# Patient Record
Sex: FEMALE | Employment: FULL TIME | URBAN - METROPOLITAN AREA
[De-identification: names, ages, dates, MRNs, and addresses within clinical notes are randomized per-mention and may not be internally consistent; named-entity substitution may affect disease eponyms.]

---

## 2022-12-26 ENCOUNTER — HOSPITAL ENCOUNTER (OUTPATIENT)
Dept: RADIOLOGY | Facility: MEDICAL CENTER | Age: 27
End: 2022-12-26
Attending: NURSE PRACTITIONER
Payer: COMMERCIAL

## 2022-12-26 DIAGNOSIS — J45.909 ASTHMATIC BRONCHITIS WITHOUT COMPLICATION, UNSPECIFIED ASTHMA SEVERITY, UNSPECIFIED WHETHER PERSISTENT: ICD-10-CM

## 2022-12-26 PROCEDURE — 71046 X-RAY EXAM CHEST 2 VIEWS: CPT

## 2022-12-27 ASSESSMENT — ENCOUNTER SYMPTOMS
CHEST TIGHTNESS: 1
SHORTNESS OF BREATH: 1
DYSPNEA AT REST: 1
HEMOPTYSIS: 0
WHEEZING: 1

## 2022-12-28 ENCOUNTER — OFFICE VISIT (OUTPATIENT)
Dept: SLEEP MEDICINE | Facility: MEDICAL CENTER | Age: 27
End: 2022-12-28
Payer: COMMERCIAL

## 2022-12-28 VITALS
HEIGHT: 63 IN | HEART RATE: 73 BPM | BODY MASS INDEX: 30.48 KG/M2 | WEIGHT: 172 LBS | DIASTOLIC BLOOD PRESSURE: 72 MMHG | SYSTOLIC BLOOD PRESSURE: 122 MMHG | OXYGEN SATURATION: 96 %

## 2022-12-28 DIAGNOSIS — R06.00 DYSPNEA, UNSPECIFIED TYPE: ICD-10-CM

## 2022-12-28 PROCEDURE — 99205 OFFICE O/P NEW HI 60 MIN: CPT | Performed by: STUDENT IN AN ORGANIZED HEALTH CARE EDUCATION/TRAINING PROGRAM

## 2022-12-28 RX ORDER — FLUTICASONE FUROATE AND VILANTEROL 100; 25 UG/1; UG/1
1 POWDER RESPIRATORY (INHALATION) EVERY 8 HOURS PRN
Qty: 60 EACH | Refills: 1 | Status: SHIPPED | OUTPATIENT
Start: 2022-12-28

## 2022-12-28 ASSESSMENT — ENCOUNTER SYMPTOMS
EYE DISCHARGE: 0
COUGH: 0
FOCAL WEAKNESS: 0
FEVER: 0
HEMOPTYSIS: 0
FALLS: 0
SPUTUM PRODUCTION: 0
NAUSEA: 0
VOMITING: 0
CHILLS: 0
WHEEZING: 1
SHORTNESS OF BREATH: 1

## 2022-12-28 NOTE — PROGRESS NOTES
Pulmonary Clinic Note    Chief Complaint:  Chief Complaint   Patient presents with    Establish Care     Referred by MEGHNA Porras for Reactive airway disease    Results     DX-Chest 12/26/22     HPI:   Jil Reyna is a very pleasant 27 y.o. female who presents to pulmonary clinic for chest tightness.  Patient states about 4 to 6 months ago, she started feeling some pressure-like feeling/tightness in her chest only at night when she would lay down and sometimes worse when she was laying on her right side.  And then recently this started happening randomly during the daytime and sometimes when she is exercising, she feels short of breath and feels like she has some wheezing.  Feels like the dry air in [place] may be making her symptoms worse-she moved here in 2020. She got an albuterol inhaler that has helped the symptoms but increases her heart rate/palpitations.  She denies any environmental triggers or the cold.  States that she keep this her home warm at night.  She does report intermittent reflux but nothing significant.  No significant exposures.  Patient states that she has gained about 40 pounds in the last 2 years and feels like this might be worsening her symptoms as well.  She reports that back in high school she had some wheezing after she exercised.  Her symptoms resolved about 2 weeks ago and then restarted again yesterday.  No fevers, chills, cough, sinus congestion, postnasal drip, nausea vomiting, weight loss, malaise/fatigue, myalgias, joint pains/aches, rashes.  No prior history of asthma or recurrent infections.        Past Medical History:   Diagnosis Date    Shortness of breath     Wheezing        History reviewed. No pertinent surgical history.    Social History     Socioeconomic History    Marital status: Unknown     Spouse name: Not on file    Number of children: Not on file    Years of education: Not on file    Highest education level: Not on file   Occupational History    Not on file  "  Tobacco Use    Smoking status: Never    Smokeless tobacco: Never   Vaping Use    Vaping Use: Never used   Substance and Sexual Activity    Alcohol use: Never    Drug use: Never    Sexual activity: Not on file   Other Topics Concern    Not on file   Social History Narrative    Not on file     Social Determinants of Health     Financial Resource Strain: Not on file   Food Insecurity: Not on file   Transportation Needs: Not on file   Physical Activity: Not on file   Stress: Not on file   Social Connections: Not on file   Intimate Partner Violence: Not on file   Housing Stability: Not on file          There is no pertinent family history.     Current Outpatient Medications on File Prior to Visit   Medication Sig Dispense Refill    IPRATROPIUM-ALBUTEROL INH Take 3 mL by nebulization 4 times a day.       No current facility-administered medications on file prior to visit.       Allergies: Patient has no allergy information on record.      ROS:   Review of Systems   Constitutional:  Negative for chills and fever.   HENT:  Negative for hearing loss.    Eyes:  Negative for discharge.   Respiratory:  Positive for shortness of breath and wheezing. Negative for cough, hemoptysis and sputum production.    Gastrointestinal:  Negative for nausea and vomiting.   Musculoskeletal:  Negative for falls.   Skin:  Negative for rash.   Neurological:  Negative for focal weakness.     Vitals:  /72 (BP Location: Right arm, Patient Position: Sitting, BP Cuff Size: Adult)   Pulse 73   Ht 1.6 m (5' 3\")   Wt 78 kg (172 lb)   SpO2 96%     Physical Exam:  Physical Exam  Vitals and nursing note reviewed.   Constitutional:       General: She is not in acute distress.     Appearance: Normal appearance. She is not ill-appearing or toxic-appearing.   HENT:      Head: Normocephalic and atraumatic.      Nose: Nose normal.      Mouth/Throat:      Mouth: Mucous membranes are moist.   Eyes:      General: No scleral icterus.     " Conjunctiva/sclera: Conjunctivae normal.   Cardiovascular:      Rate and Rhythm: Normal rate and regular rhythm.   Pulmonary:      Effort: Pulmonary effort is normal. No respiratory distress.      Breath sounds: No wheezing, rhonchi or rales.   Abdominal:      Palpations: Abdomen is soft.   Musculoskeletal:         General: No deformity or signs of injury. Normal range of motion.      Cervical back: Normal range of motion.   Skin:     General: Skin is warm and dry.   Neurological:      General: No focal deficit present.      Mental Status: She is alert. Mental status is at baseline.   Psychiatric:         Mood and Affect: Mood normal.         Behavior: Behavior normal.       Data:    CXR 12.26.22  1.  Questionable hazy opacity medial right lower lobe or right middle lobe seen only on the PA view which could represent atelectasis or could be due to overlapping vascular structures.    Assessment/Plan:    Problem List Items Addressed This Visit       Dyspnea     Symptoms potentially secondary to asthma/reactive airways.     -Start as needed ICS/LABA -discussed her uses before exercise as well as as needed to see if improves her symptoms  -Encouraged to stay active/exercise routinely to aid with her weight loss  -PFTs  -CXR shows there is hazy opacity in the medial RLL or RML unclear etiology-I did discuss with patient that she could have some sort of potential mass or obstruction here causing some of her symptoms or this could just be overlapping vascular structures are seen on the CXR.  We discussed that we can do a CT chest to give further information.  Patient would like to try the inhaler and breathing exercise/diet regimen for some weight loss and if she does not note any significant improvement in her symptoms, then she agrees to get a CT at next follow-up         Relevant Orders    PULMONARY FUNCTION TESTS -Test requested: Complete Pulmonary Function Test       Return in about 3 months (around 3/28/2023).      This note was generated using voice recognition software which has a chance of producing errors of grammar and possibly content.  I have made every reasonable attempt to find and correct any obvious errors, but it should be expected that some may not be found prior to finalization of this note.    Time spent in record review prior to patient arrival, reviewing results, and in face-to-face encounter totaled 60 min, excluding any procedures if performed.    Cinda Bustillo MD  Pulmonary and Critical Care Medicine  [facility]

## 2022-12-28 NOTE — ASSESSMENT & PLAN NOTE
Symptoms potentially secondary to asthma/reactive airways.     -Start as needed ICS/LABA -discussed her uses before exercise as well as as needed to see if improves her symptoms  -Encouraged to stay active/exercise routinely to aid with her weight loss  -PFTs  -CXR shows there is hazy opacity in the medial RLL or RML unclear etiology-I did discuss with patient that she could have some sort of potential mass or obstruction here causing some of her symptoms or this could just be overlapping vascular structures are seen on the CXR.  We discussed that we can do a CT chest to give further information.  Patient would like to try the inhaler and breathing exercise/diet regimen for some weight loss and if she does not note any significant improvement in her symptoms, then she agrees to get a CT at next follow-up

## 2023-01-20 ENCOUNTER — TELEPHONE (OUTPATIENT)
Dept: HEALTH INFORMATION MANAGEMENT | Facility: OTHER | Age: 28
End: 2023-01-20
Payer: COMMERCIAL

## 2024-03-18 ENCOUNTER — APPOINTMENT (RX ONLY)
Dept: URBAN - METROPOLITAN AREA CLINIC 4 | Facility: CLINIC | Age: 29
Setting detail: DERMATOLOGY
End: 2024-03-18

## 2024-03-18 DIAGNOSIS — L81.4 OTHER MELANIN HYPERPIGMENTATION: ICD-10-CM

## 2024-03-18 DIAGNOSIS — Z71.89 OTHER SPECIFIED COUNSELING: ICD-10-CM

## 2024-03-18 DIAGNOSIS — L85.3 XEROSIS CUTIS: ICD-10-CM

## 2024-03-18 DIAGNOSIS — D18.0 HEMANGIOMA: ICD-10-CM

## 2024-03-18 DIAGNOSIS — L82.1 OTHER SEBORRHEIC KERATOSIS: ICD-10-CM

## 2024-03-18 DIAGNOSIS — D22 MELANOCYTIC NEVI: ICD-10-CM

## 2024-03-18 PROBLEM — D48.5 NEOPLASM OF UNCERTAIN BEHAVIOR OF SKIN: Status: ACTIVE | Noted: 2024-03-18

## 2024-03-18 PROBLEM — D22.5 MELANOCYTIC NEVI OF TRUNK: Status: ACTIVE | Noted: 2024-03-18

## 2024-03-18 PROBLEM — D18.01 HEMANGIOMA OF SKIN AND SUBCUTANEOUS TISSUE: Status: ACTIVE | Noted: 2024-03-18

## 2024-03-18 PROCEDURE — ? COUNSELING

## 2024-03-18 PROCEDURE — 99203 OFFICE O/P NEW LOW 30 MIN: CPT | Mod: 25

## 2024-03-18 PROCEDURE — ? PHOTO-DOCUMENTATION

## 2024-03-18 PROCEDURE — ? DIAGNOSIS COMMENT

## 2024-03-18 PROCEDURE — ? BIOPSY BY SHAVE METHOD

## 2024-03-18 PROCEDURE — ? OBSERVATION

## 2024-03-18 PROCEDURE — 11102 TANGNTL BX SKIN SINGLE LES: CPT

## 2024-03-18 ASSESSMENT — LOCATION DETAILED DESCRIPTION DERM
LOCATION DETAILED: RIGHT SUPERIOR MEDIAL UPPER BACK
LOCATION DETAILED: MIDDLE STERNUM
LOCATION DETAILED: UPPER STERNUM
LOCATION DETAILED: RIGHT LATERAL SUPERIOR EYELID
LOCATION DETAILED: LEFT SUPERIOR MEDIAL LOWER BACK
LOCATION DETAILED: RIGHT INFERIOR UPPER BACK
LOCATION DETAILED: LEFT MEDIAL SUPERIOR CHEST
LOCATION DETAILED: RIGHT MID-UPPER BACK

## 2024-03-18 ASSESSMENT — LOCATION SIMPLE DESCRIPTION DERM
LOCATION SIMPLE: RIGHT SUPERIOR EYELID
LOCATION SIMPLE: LEFT LOWER BACK
LOCATION SIMPLE: RIGHT UPPER BACK
LOCATION SIMPLE: CHEST

## 2024-03-18 ASSESSMENT — LOCATION ZONE DERM
LOCATION ZONE: EYELID
LOCATION ZONE: TRUNK

## 2024-03-18 NOTE — PROCEDURE: DIAGNOSIS COMMENT
Render Risk Assessment In Note?: no
Detail Level: Simple
Comment: Patient’s lesion of concern.  Patient reports lesion use to be dark brown.

## 2024-03-18 NOTE — PROCEDURE: PHOTO-DOCUMENTATION
Detail Level: Detailed
Photo Preface (Leave Blank If You Do Not Want): Photographs were obtained today
Skin normal color for race, warm, dry and intact. No evidence of rash.